# Patient Record
(demographics unavailable — no encounter records)

---

## 2024-12-12 NOTE — PLAN
[FreeTextEntry1] : Pt is here for an annual PE.   Today pt is presenting with c/o nasal congestion/ headache/ cough/ ear pain going on for a week. Pt reports clear fluid discharge. Pt went to Urgent care. Pt took Augmentin for 1 week due to ear infection. But pt reports co cough and viral infection. Pt was prescribed for another 3-5 days to manage ear infection. Pt encouraged to use Flonase BID. Pt was also given Metrazol/prednisone to manage fluid. Recommend supportive care including antipyretics, fluids, rest, and nasal saline followed by nasal suction. Discussed risks & benefits of oseltamivir. Potential side effect of oseltamivir includes but not limited to nausea, vomiting, and sleep disruption.  OBGYN:  Pt is due for a mammogram. Pt also given script for Dexa bone denisty scan. Pt follows up with Dr. Baptiste.   GI: Pt has a gal stone after scan for Diverticulosis. Pt had Diverticulosis twice this year.  Pt is UTD with colonoscopy: 06/24. repeat 5 years. Dr. Leigh.Pt sxs are currently stable.  CVD: Pt takes duloxetine and Rosuvastatin Ca2+ to manage HLD. Pt sees Dr. Dubois and pt encouraged to co monitor sxs.   skin: MSK: 9/10 surgery. pt to co following up with dermatologist to monitor sxs.  Pt had surgery for melanoma and reports everything is normal.    reviewed labs: pt encouraged to monitor blood glucose because pt is prediabetic. Pt has been taking honey and tea. Pt encouraged  monitor diet and do daily PA.  Daily exercise aerobic 15 minutes per day, gradually increasing to at least 30 minutes per day. Recommended dietary changes including 90-100g of protein, aerobic exercise and emphasized strength training regimen at least 2x weekly to maintain healthy lean muscle mass and bone density.  Pt is UTD with flu vaccines.    RTO in 6 months for f.u.

## 2024-12-12 NOTE — HEALTH RISK ASSESSMENT
[Good] : ~his/her~  mood as  good [Yes] : Yes [2 - 4 times a month (2 pts)] : 2-4 times a month (2 points) [1 or 2 (0 pts)] : 1 or 2 (0 points) [Never (0 pts)] : Never (0 points) [No falls in past year] : Patient reported no falls in the past year [0] : 2) Feeling down, depressed, or hopeless: Not at all (0) [PHQ-2 Negative - No further assessment needed] : PHQ-2 Negative - No further assessment needed [Former] : Former [Audit-CScore] : 2 [[id number]] : 0 [MammogramDate] : 12/23 [MammogramComments] : Pt. need rx.  [ColonoscopyDate] : 06/24 [ColonoscopyComments] : repeat 5 years. Dr. Leigh

## 2024-12-12 NOTE — HISTORY OF PRESENT ILLNESS
[FreeTextEntry1] : Pt. c/o nasal congestion/ headache/ cough/ ear pain going on for a week.  [de-identified] : 69 year y.o female pt is here today for an annual physical examination. Pt is in a good mood. Pt is currently following up with a cardiologist,GI, OBGYN, and ortho. Today pt is presenting with c/o nasal congestion/ headache/ cough/ ear pain going on for a week. Pt reports clear fluid discharge. Pt had surgery for melanoma in [facility]. Pt had Diverticulosis twice this year. Pt is due for a mammogram. Pt is UTD with colonoscopy: 06/24. repeat 5 years. Dr. Leigh.Pt sxs are currently stable. No active complaints today. UTD with immunization.

## 2024-12-12 NOTE — ADDENDUM
[FreeTextEntry1] :    IFelipeghar wrote this note acting as a scribe for Dr. Perri Christianson MD on Dec 10, 2024 .   I, Dr. Perri Christianson MD, ordering physician, have read and attest that all the information, medical decision making and discharge instructions within are true and accurate on 12/10/2024.

## 2024-12-12 NOTE — HISTORY OF PRESENT ILLNESS
[FreeTextEntry1] : Pt. c/o nasal congestion/ headache/ cough/ ear pain going on for a week.  [de-identified] : 69 year y.o female pt is here today for an annual physical examination. Pt is in a good mood. Pt is currently following up with a cardiologist,GI, OBGYN, and ortho. Today pt is presenting with c/o nasal congestion/ headache/ cough/ ear pain going on for a week. Pt reports clear fluid discharge. Pt had surgery for melanoma in [facility]. Pt had Diverticulosis twice this year. Pt is due for a mammogram. Pt is UTD with colonoscopy: 06/24. repeat 5 years. Dr. Leigh.Pt sxs are currently stable. No active complaints today. UTD with immunization.

## 2024-12-12 NOTE — HEALTH RISK ASSESSMENT
[Good] : ~his/her~  mood as  good [Yes] : Yes [2 - 4 times a month (2 pts)] : 2-4 times a month (2 points) [1 or 2 (0 pts)] : 1 or 2 (0 points) [Never (0 pts)] : Never (0 points) [No falls in past year] : Patient reported no falls in the past year [0] : 2) Feeling down, depressed, or hopeless: Not at all (0) [PHQ-2 Negative - No further assessment needed] : PHQ-2 Negative - No further assessment needed [Former] : Former [Audit-CScore] : 2 [[id number]] : 0 [MammogramDate] : 12/23 [MammogramComments] : Pt. need rx.  [ColonoscopyDate] : 06/24 [ColonoscopyComments] : repeat 5 years. Dr. Leigh

## 2025-02-25 NOTE — DISCUSSION/SUMMARY
[FreeTextEntry1] : IMPRESSION>> Patient with unilateral intermittent action tremor, very mild on examination today which patient endorses is not normal for tremor. DDx at this time is nerve impingement vs essential tremor. Due to neck pain leaning more towards nerve impingement. Less suspicious for Parkinson's.    EXAM FINDINGS>> III, IV, VI: PERRL, EOMI slight dysconjugate gaze as patient had strabismus from birth, VFF, nystagmus (states had from birth) MOTOR: Very slight intermittent tremors on L hand. Not normal frequency per patient, normally more noticeable.  IMAGING>> None   LABS>> TSH and T4 in 9/2024 WNL   PLAN>> -MRI brain -MRI C spine -MRI L spine - Labwork -Continue medical massage -Light neck stretching, warm compress --Pt would like to hold off on PT for right now, will reconsider at next apt -Try to capture tremor on video  RTC 1 month   Answered all questions and concerns to the best of my ability. Advised to call for any new or worsening symptoms.  In order to maintain continuity of care, patients must be seen on a yearly basis.  Total time spent on the day of the visit, including pre-visit and post-visit time was 60 minutes.

## 2025-02-25 NOTE — PHYSICAL EXAM
[FreeTextEntry1] : GENERAL PHYSICAL EXAM: GEN: no acute distress, normal affect EYES:  sclera white, conjunctiva clear PULM: no respiratory distress, normal rate EXT: no edema, no cyanosis Neck: Normal ROM, no bilateral trapezius muscle spasm, no tenderness-- cyst on patient posterior neck on R side SKIN: warm, dry, no rash or lesion on exposed skin-- posterior to right shoulder is 2-3 inch healed lac from melanoma removal   NEUROLOGICAL EXAM: MENTAL STATUS Orientation: alert and oriented to person, place, time, and situation Language: clear and fluent, intact comprehension and repetition   CRANIAL NERVES II: visual fields full to confrontation III, IV, VI:  PERRL, EOMI slight dysconjugate gaze as patient had strabismus from birth, VFF, nystagmus  (states had from birth), no ptosis V, VII: facial sensation and movement intact and symmetric VIII: hearing intact to finger rub bilat IX, X: uvula midline, soft palate elevates normally XI: BL shoulder shrug intact XII: tongue midline   MOTOR: Bilateral muscle strength 5/5 in UE and LE, proximally and distally, symmetric throughout Tone and bulk are normal in upper and lower limbs. Very slight intermittent tremors on L hand. Not normal frequency per patient, normally more noticable.    SENSATION: Intact to light touch and temp in all 4 EXTs,   COORDINATION: Normal FNF bilateral GAIT Normal stance, stride, and pivot turn

## 2025-02-25 NOTE — HISTORY OF PRESENT ILLNESS
[FreeTextEntry1] : Patient is a 69 year old female with PMHx of melanoma, arthritis, BL hip replacement, HLD, BL macular degeneration (L>R) being brought in for initial evaluation for intermittent tremor of the left hand. Unaware of any essential tremor or Parkinson's family history. The tremor she noticed most while holding something or while it is in motion, rarely at rest. She endorses neck pain, states she is a  and is on the computer all day. She does receive medical massages for this, has not yet gone to PT. She states her left distal pinky has decreased sensation, saw a hand specialist for it who stated it was likely 2/2 arthritis. She endorses pins and needles in lower back, that is non radiating to either leg.   Of note: sister just diagnosed with dementia.  Denies confusion, memory lapse, HA, focal weakness, speech deficit, difficulty walking, fever, chills, sleep disturbances.

## 2025-03-27 NOTE — HISTORY OF PRESENT ILLNESS
[FreeTextEntry1] : Patient seen today for follow up. She states the tremor has improved, she states she was unable to capture it on video. States she feels less concerned about it now than before. She remains with neck and back pain, continues with massage therapy once a month. She was made aware of MRI C and L spine results. She states she does not know why MRI brain was not done.   Patient is still currently debating whether to do PT, as she is very busy. She is aware she will be given neuro sx referral as well for severe C spine stenosis.  Her pinky remains with decreased sensation in the tip intermittently. Follows with a hand specialist.  She will be having follow up for her thyroid nodules in the left lobe. No new neurological symptoms today, she is doing well otherwise.   Denies dizziness, vision disturbance, HA, focal weakness, speech changes, balance disturbance, difficulty walking    <<ORIGINAL HPI>> Patient is a 69 year old female with PMHx of melanoma, arthritis, BL hip replacement, HLD, BL macular degeneration (L>R) being brought in for initial evaluation for intermittent tremor of the left hand. Unaware of any essential tremor or Parkinson's family history. The tremor she noticed most while holding something or while it is in motion, rarely at rest. She endorses neck pain, states she is a  and is on the computer all day. She does receive medical massages for this, has not yet gone to PT. She states her left distal pinky has decreased sensation, saw a hand specialist for it who stated it was likely 2/2 arthritis. She endorses pins and needles in lower back, that is non radiating to either leg.   Of note: sister just diagnosed with dementia.  Denies confusion, memory lapse, HA, focal weakness, speech deficit, difficulty walking, fever, chills, sleep disturbances.

## 2025-03-27 NOTE — DISCUSSION/SUMMARY
[FreeTextEntry1] : IMPRESSION>> Patients tremor has decreased since initial visit, she states she does not have it very often anymore. Remains with neck and back pain-- imaging consistent with degenerative disc disease and spinal stenosis in the C and L spine.    EXAM FINDINGS>> III, IV, VI: PERRL, EOMI slight dysconjugate gaze as patient had strabismus from birth, VFF, nystagmus (states had from birth) MOTOR: Very slight intermittent tremors on L hand. Not normal frequency per patient, normally more noticeable.  IMAGING>> MRI C SPINE 3/4/2025 1. Grade 1 anterolisthesis of C2 on C3. 2. Modic type II endplate changes at C3-C4, C4-C5 and C6-C7. 3. At C3-4: Disc bulge with uncinate hypertrophy and bilateral facet arthrosis resulting in mild spinal canal stenosis and severe bilateral neural foraminal narrowing. 4. At C4-5: Disc bulge with uncinate hypertrophy and bilateral facet arthrosis resulting in moderate spinal canal stenosis and moderate bilateral neural foraminal narrowing. 5. At C5-6: Disc bulge with uncinate hypertrophy and bilateral facet arthrosis resulting in mild spinal canal stenosis and severe right and moderate left-sided neural foraminal narrowing. 6. At C6-7: Disc bulge with uncinate hypertrophy and bilateral facet arthrosis resulting in mild spinal canal stenosis and moderate-to-severe bilateral neural foraminal narrowing. 7. Multinodular thyroid gland.   MRI L SPINE 3/3/2025: 1. Grade 1 retrolisthesis of L2 on L3 and L5 on S1. 2. Modic type I endplate changes at L1-L2 and L2-L3. 3. At L1-2: Disc bulge with bilateral facet arthrosis resulting in mild spinal canal stenosis and mild bilateral neural foraminal narrowing. 4. At L2-3: Disc bulge with a superimposed left lateralizing disc herniation with thickening of the ligamentum flavum and facet arthrosis resulting in mild spinal canal stenosis and mild bilateral neural foraminal narrowing. 5. At L3-4: Disc bulge with a superimposed left foraminal disc herniation with bilateral facet arthrosis resulting in moderate spinal canal stenosis and mild bilateral neural foraminal narrowing. 6. At L4-5: Disc bulge with thickening of ligamentum flavum and facet arthrosis resulting in mild spinal canal stenosis and mild bilateral neural foraminal narrowing. 7. At L5-S1: Disc bulge with a superimposed central protrusion type disc herniation with bilateral facet arthrosis resulting in moderate spinal canal stenosis and mild bilateral neural foraminal narrowing.  LABS>> TSH and T4 WNL  B12  a1c 6.3%  PLAN>> -MRI brain -Neurosx referral -Physical therapy -Supplement B12 -Modify a1c, diet modification, follow up PCP -Continue medical massage -Light neck stretching, warm compress -Try to capture tremor on video if returns  RTC 3 months  Answered all questions and concerns to the best of my ability. Advised to call for any new or worsening symptoms.  In order to maintain continuity of care, patients must be seen on a yearly basis.  Total time spent on the day of the visit, including pre-visit and post-visit time was 30 minutes.